# Patient Record
Sex: MALE | Race: WHITE | Employment: FULL TIME | URBAN - METROPOLITAN AREA
[De-identification: names, ages, dates, MRNs, and addresses within clinical notes are randomized per-mention and may not be internally consistent; named-entity substitution may affect disease eponyms.]

---

## 2020-10-19 NOTE — PERIOP NOTES
Spoke to Emelyn at Dr Ricardo Prieto office. Stated pt lives in [place], is having pre-assessment completed in [place], and will bring copy of COVID results with him day of surgery. Phoned pt and asked to email COVID results as soon as he could. [employer] email address given and he will send them soon.

## 2020-10-22 ENCOUNTER — ANESTHESIA EVENT (OUTPATIENT)
Dept: SURGERY | Age: 55
DRG: 327 | End: 2020-10-22
Payer: COMMERCIAL

## 2020-10-22 NOTE — ANESTHESIA PREPROCEDURE EVALUATION
Relevant Problems   No relevant active problems       Anesthetic History               Review of Systems / Medical History  Patient summary reviewed and pertinent labs reviewed    Pulmonary                   Neuro/Psych              Cardiovascular                  Exercise tolerance: >4 METS     GI/Hepatic/Renal               Comments: Achalasia Endo/Other        Cancer (Tonsil s/p Radiation Tx)     Other Findings            Physical Exam    Airway  Mallampati: II  TM Distance: > 6 cm  Neck ROM: normal range of motion   Mouth opening: Normal     Cardiovascular  Regular rate and rhythm,  S1 and S2 normal,  no murmur, click, rub, or gallop             Dental  No notable dental hx       Pulmonary  Breath sounds clear to auscultation               Abdominal         Other Findings            Anesthetic Plan    ASA: 2  Anesthesia type: general            Anesthetic plan and risks discussed with: Patient and Spouse

## 2020-10-23 ENCOUNTER — HOSPITAL ENCOUNTER (INPATIENT)
Age: 55
LOS: 3 days | Discharge: HOME | DRG: 327 | End: 2020-10-26
Attending: SURGERY | Admitting: SURGERY
Payer: COMMERCIAL

## 2020-10-23 ENCOUNTER — ANESTHESIA (OUTPATIENT)
Dept: SURGERY | Age: 55
DRG: 327 | End: 2020-10-23
Payer: COMMERCIAL

## 2020-10-23 DIAGNOSIS — K22.0 ACHALASIA OF ESOPHAGUS: ICD-10-CM

## 2020-10-23 LAB
ABO + RH BLD: NORMAL
ANION GAP SERPL CALC-SCNC: 4 MMOL/L (ref 7–16)
BASOPHILS # BLD: 0 K/UL (ref 0–0.2)
BASOPHILS NFR BLD: 1 % (ref 0–2)
BLOOD GROUP ANTIBODIES SERPL: NORMAL
BUN SERPL-MCNC: 21 MG/DL (ref 6–23)
CALCIUM SERPL-MCNC: 9.1 MG/DL (ref 8.3–10.4)
CHLORIDE SERPL-SCNC: 109 MMOL/L (ref 98–107)
CO2 SERPL-SCNC: 30 MMOL/L (ref 21–32)
CREAT SERPL-MCNC: 1.08 MG/DL (ref 0.8–1.5)
DIFFERENTIAL METHOD BLD: ABNORMAL
EOSINOPHIL # BLD: 0.1 K/UL (ref 0–0.8)
EOSINOPHIL NFR BLD: 2 % (ref 0.5–7.8)
ERYTHROCYTE [DISTWIDTH] IN BLOOD BY AUTOMATED COUNT: 13.9 % (ref 11.9–14.6)
GLUCOSE SERPL-MCNC: 101 MG/DL (ref 65–100)
HCT VFR BLD AUTO: 44.5 % (ref 41.1–50.3)
HGB BLD-MCNC: 15 G/DL (ref 13.6–17.2)
IMM GRANULOCYTES # BLD AUTO: 0 K/UL (ref 0–0.5)
IMM GRANULOCYTES NFR BLD AUTO: 0 % (ref 0–5)
LYMPHOCYTES # BLD: 1.4 K/UL (ref 0.5–4.6)
LYMPHOCYTES NFR BLD: 30 % (ref 13–44)
MCH RBC QN AUTO: 29.4 PG (ref 26.1–32.9)
MCHC RBC AUTO-ENTMCNC: 33.7 G/DL (ref 31.4–35)
MCV RBC AUTO: 87.3 FL (ref 79.6–97.8)
MONOCYTES # BLD: 0.6 K/UL (ref 0.1–1.3)
MONOCYTES NFR BLD: 12 % (ref 4–12)
NEUTS SEG # BLD: 2.7 K/UL (ref 1.7–8.2)
NEUTS SEG NFR BLD: 56 % (ref 43–78)
NRBC # BLD: 0 K/UL (ref 0–0.2)
PLATELET # BLD AUTO: 146 K/UL (ref 150–450)
PMV BLD AUTO: 9.8 FL (ref 9.4–12.3)
POTASSIUM SERPL-SCNC: 3.9 MMOL/L (ref 3.5–5.1)
RBC # BLD AUTO: 5.1 M/UL (ref 4.23–5.6)
SODIUM SERPL-SCNC: 143 MMOL/L (ref 136–145)
SPECIMEN EXP DATE BLD: NORMAL
WBC # BLD AUTO: 4.8 K/UL (ref 4.3–11.1)

## 2020-10-23 PROCEDURE — 74011000250 HC RX REV CODE- 250: Performed by: NURSE ANESTHETIST, CERTIFIED REGISTERED

## 2020-10-23 PROCEDURE — 77030002933 HC SUT MCRYL J&J -A: Performed by: SURGERY

## 2020-10-23 PROCEDURE — 77030031641: Performed by: SURGERY

## 2020-10-23 PROCEDURE — 76010000877 HC OR TIME 2.5 TO 3HR INTENSV - TIER 2: Performed by: SURGERY

## 2020-10-23 PROCEDURE — 0D844ZZ DIVISION OF ESOPHAGOGASTRIC JUNCTION, PERCUTANEOUS ENDOSCOPIC APPROACH: ICD-10-PCS | Performed by: SURGERY

## 2020-10-23 PROCEDURE — 74011250636 HC RX REV CODE- 250/636: Performed by: ANESTHESIOLOGY

## 2020-10-23 PROCEDURE — 77030040503 HC DRN WND PENRS MDII -A: Performed by: SURGERY

## 2020-10-23 PROCEDURE — 77030008462 HC STPLR SKN PROX J&J -A: Performed by: SURGERY

## 2020-10-23 PROCEDURE — 76060000036 HC ANESTHESIA 2.5 TO 3 HR: Performed by: SURGERY

## 2020-10-23 PROCEDURE — 43279 LAP MYOTOMY HELLER: CPT | Performed by: PHYSICIAN ASSISTANT

## 2020-10-23 PROCEDURE — 77030031139 HC SUT VCRL2 J&J -A: Performed by: SURGERY

## 2020-10-23 PROCEDURE — 43279 LAP MYOTOMY HELLER: CPT | Performed by: SURGERY

## 2020-10-23 PROCEDURE — 77030035048 HC TRCR ENDOSC OPTCL COVD -B: Performed by: SURGERY

## 2020-10-23 PROCEDURE — 77030002996 HC SUT SLK J&J -A: Performed by: SURGERY

## 2020-10-23 PROCEDURE — 74011250637 HC RX REV CODE- 250/637: Performed by: ANESTHESIOLOGY

## 2020-10-23 PROCEDURE — 77030018014 HC RETRCTR ENDOSC1 COVD -C: Performed by: SURGERY

## 2020-10-23 PROCEDURE — 77030034849: Performed by: SURGERY

## 2020-10-23 PROCEDURE — 74011000250 HC RX REV CODE- 250: Performed by: PHYSICIAN ASSISTANT

## 2020-10-23 PROCEDURE — 77030035277 HC OBTRTR BLDELSS DISP INTU -B: Performed by: SURGERY

## 2020-10-23 PROCEDURE — 77030008756 HC TU IRR SUC STRY -B: Performed by: SURGERY

## 2020-10-23 PROCEDURE — 86900 BLOOD TYPING SEROLOGIC ABO: CPT

## 2020-10-23 PROCEDURE — 77030002912 HC SUT ETHBND J&J -A: Performed by: SURGERY

## 2020-10-23 PROCEDURE — 74011250636 HC RX REV CODE- 250/636

## 2020-10-23 PROCEDURE — 77030037088 HC TUBE ENDOTRACH ORAL NSL COVD-A: Performed by: NURSE ANESTHETIST, CERTIFIED REGISTERED

## 2020-10-23 PROCEDURE — 77030039283 HC SHR HARM INSRT DISP DAVNC INTU -F: Performed by: SURGERY

## 2020-10-23 PROCEDURE — 77030016151 HC PROTCTR LNS DFOG COVD -B: Performed by: SURGERY

## 2020-10-23 PROCEDURE — 77030021689 HC INSRT SHR HARM INTU -E: Performed by: SURGERY

## 2020-10-23 PROCEDURE — 65270000029 HC RM PRIVATE

## 2020-10-23 PROCEDURE — 77030012406 HC DRN WND PENRS BARD -A: Performed by: SURGERY

## 2020-10-23 PROCEDURE — 2709999900 HC NON-CHARGEABLE SUPPLY

## 2020-10-23 PROCEDURE — 2709999900 HC NON-CHARGEABLE SUPPLY: Performed by: SURGERY

## 2020-10-23 PROCEDURE — 77030008522 HC TBNG INSUF LAPRO STRY -B: Performed by: SURGERY

## 2020-10-23 PROCEDURE — 74011250637 HC RX REV CODE- 250/637: Performed by: SURGERY

## 2020-10-23 PROCEDURE — 77030019908 HC STETH ESOPH SIMS -A: Performed by: NURSE ANESTHETIST, CERTIFIED REGISTERED

## 2020-10-23 PROCEDURE — 0DQ64ZZ REPAIR STOMACH, PERCUTANEOUS ENDOSCOPIC APPROACH: ICD-10-PCS | Performed by: SURGERY

## 2020-10-23 PROCEDURE — 74011250636 HC RX REV CODE- 250/636: Performed by: PHYSICIAN ASSISTANT

## 2020-10-23 PROCEDURE — 77030020703 HC SEAL CANN DISP INTU -B: Performed by: SURGERY

## 2020-10-23 PROCEDURE — 74011250636 HC RX REV CODE- 250/636: Performed by: NURSE PRACTITIONER

## 2020-10-23 PROCEDURE — 80048 BASIC METABOLIC PNL TOTAL CA: CPT

## 2020-10-23 PROCEDURE — C9113 INJ PANTOPRAZOLE SODIUM, VIA: HCPCS | Performed by: PHYSICIAN ASSISTANT

## 2020-10-23 PROCEDURE — 76210000006 HC OR PH I REC 0.5 TO 1 HR: Performed by: SURGERY

## 2020-10-23 PROCEDURE — 77030039425 HC BLD LARYNG TRULITE DISP TELE -A: Performed by: NURSE ANESTHETIST, CERTIFIED REGISTERED

## 2020-10-23 PROCEDURE — 74011250636 HC RX REV CODE- 250/636: Performed by: NURSE ANESTHETIST, CERTIFIED REGISTERED

## 2020-10-23 PROCEDURE — 77030010507 HC ADH SKN DERMBND J&J -B: Performed by: SURGERY

## 2020-10-23 PROCEDURE — 74011000250 HC RX REV CODE- 250: Performed by: SURGERY

## 2020-10-23 PROCEDURE — 74011000258 HC RX REV CODE- 258: Performed by: PHYSICIAN ASSISTANT

## 2020-10-23 PROCEDURE — 8E0W4CZ ROBOTIC ASSISTED PROCEDURE OF TRUNK REGION, PERCUTANEOUS ENDOSCOPIC APPROACH: ICD-10-PCS | Performed by: SURGERY

## 2020-10-23 PROCEDURE — 77030040922 HC BLNKT HYPOTHRM STRY -A: Performed by: NURSE ANESTHETIST, CERTIFIED REGISTERED

## 2020-10-23 PROCEDURE — 85025 COMPLETE CBC W/AUTO DIFF WBC: CPT

## 2020-10-23 RX ORDER — DEXTROSE MONOHYDRATE AND SODIUM CHLORIDE 5; .45 G/100ML; G/100ML
75 INJECTION, SOLUTION INTRAVENOUS CONTINUOUS
Status: DISCONTINUED | OUTPATIENT
Start: 2020-10-23 | End: 2020-10-26 | Stop reason: HOSPADM

## 2020-10-23 RX ORDER — ONDANSETRON 2 MG/ML
INJECTION INTRAMUSCULAR; INTRAVENOUS AS NEEDED
Status: DISCONTINUED | OUTPATIENT
Start: 2020-10-23 | End: 2020-10-23 | Stop reason: HOSPADM

## 2020-10-23 RX ORDER — ONDANSETRON 2 MG/ML
4 INJECTION INTRAMUSCULAR; INTRAVENOUS
Status: DISCONTINUED | OUTPATIENT
Start: 2020-10-23 | End: 2020-10-26 | Stop reason: HOSPADM

## 2020-10-23 RX ORDER — KETOROLAC TROMETHAMINE 30 MG/ML
15 INJECTION, SOLUTION INTRAMUSCULAR; INTRAVENOUS
Status: DISPENSED | OUTPATIENT
Start: 2020-10-23 | End: 2020-10-24

## 2020-10-23 RX ORDER — EPHEDRINE SULFATE/0.9% NACL/PF 50 MG/5 ML
SYRINGE (ML) INTRAVENOUS AS NEEDED
Status: DISCONTINUED | OUTPATIENT
Start: 2020-10-23 | End: 2020-10-23 | Stop reason: HOSPADM

## 2020-10-23 RX ORDER — HYDROMORPHONE HYDROCHLORIDE 2 MG/ML
0.5 INJECTION, SOLUTION INTRAMUSCULAR; INTRAVENOUS; SUBCUTANEOUS
Status: DISCONTINUED | OUTPATIENT
Start: 2020-10-23 | End: 2020-10-23 | Stop reason: HOSPADM

## 2020-10-23 RX ORDER — LIDOCAINE HYDROCHLORIDE 20 MG/ML
INJECTION, SOLUTION EPIDURAL; INFILTRATION; INTRACAUDAL; PERINEURAL AS NEEDED
Status: DISCONTINUED | OUTPATIENT
Start: 2020-10-23 | End: 2020-10-23 | Stop reason: HOSPADM

## 2020-10-23 RX ORDER — BUPIVACAINE HYDROCHLORIDE AND EPINEPHRINE 2.5; 5 MG/ML; UG/ML
INJECTION, SOLUTION EPIDURAL; INFILTRATION; INTRACAUDAL; PERINEURAL AS NEEDED
Status: DISCONTINUED | OUTPATIENT
Start: 2020-10-23 | End: 2020-10-23 | Stop reason: HOSPADM

## 2020-10-23 RX ORDER — CEFAZOLIN SODIUM/WATER 2 G/20 ML
2 SYRINGE (ML) INTRAVENOUS
Status: COMPLETED | OUTPATIENT
Start: 2020-10-23 | End: 2020-10-23

## 2020-10-23 RX ORDER — HYDROCODONE BITARTRATE AND ACETAMINOPHEN 7.5; 325 MG/1; MG/1
1 TABLET ORAL AS NEEDED
Status: DISCONTINUED | OUTPATIENT
Start: 2020-10-23 | End: 2020-10-23

## 2020-10-23 RX ORDER — GUAIFENESIN 100 MG/5ML
81 LIQUID (ML) ORAL DAILY
COMMUNITY

## 2020-10-23 RX ORDER — FENTANYL CITRATE 50 UG/ML
100 INJECTION, SOLUTION INTRAMUSCULAR; INTRAVENOUS ONCE
Status: DISCONTINUED | OUTPATIENT
Start: 2020-10-23 | End: 2020-10-23 | Stop reason: HOSPADM

## 2020-10-23 RX ORDER — NALOXONE HYDROCHLORIDE 0.4 MG/ML
0.4 INJECTION, SOLUTION INTRAMUSCULAR; INTRAVENOUS; SUBCUTANEOUS AS NEEDED
Status: DISCONTINUED | OUTPATIENT
Start: 2020-10-23 | End: 2020-10-26 | Stop reason: HOSPADM

## 2020-10-23 RX ORDER — ROCURONIUM BROMIDE 10 MG/ML
INJECTION, SOLUTION INTRAVENOUS AS NEEDED
Status: DISCONTINUED | OUTPATIENT
Start: 2020-10-23 | End: 2020-10-23 | Stop reason: HOSPADM

## 2020-10-23 RX ORDER — SODIUM CHLORIDE 0.9 % (FLUSH) 0.9 %
5-40 SYRINGE (ML) INJECTION AS NEEDED
Status: DISCONTINUED | OUTPATIENT
Start: 2020-10-23 | End: 2020-10-23 | Stop reason: HOSPADM

## 2020-10-23 RX ORDER — DEXAMETHASONE SODIUM PHOSPHATE 4 MG/ML
INJECTION, SOLUTION INTRA-ARTICULAR; INTRALESIONAL; INTRAMUSCULAR; INTRAVENOUS; SOFT TISSUE AS NEEDED
Status: DISCONTINUED | OUTPATIENT
Start: 2020-10-23 | End: 2020-10-23 | Stop reason: HOSPADM

## 2020-10-23 RX ORDER — ONDANSETRON 2 MG/ML
INJECTION INTRAMUSCULAR; INTRAVENOUS
Status: COMPLETED
Start: 2020-10-23 | End: 2020-10-23

## 2020-10-23 RX ORDER — HYDROMORPHONE HYDROCHLORIDE 1 MG/ML
1 INJECTION, SOLUTION INTRAMUSCULAR; INTRAVENOUS; SUBCUTANEOUS
Status: DISCONTINUED | OUTPATIENT
Start: 2020-10-23 | End: 2020-10-26 | Stop reason: HOSPADM

## 2020-10-23 RX ORDER — OXYCODONE HYDROCHLORIDE 5 MG/1
5 TABLET ORAL
Status: DISCONTINUED | OUTPATIENT
Start: 2020-10-23 | End: 2020-10-23

## 2020-10-23 RX ORDER — MIDAZOLAM HYDROCHLORIDE 1 MG/ML
INJECTION, SOLUTION INTRAMUSCULAR; INTRAVENOUS AS NEEDED
Status: DISCONTINUED | OUTPATIENT
Start: 2020-10-23 | End: 2020-10-23 | Stop reason: HOSPADM

## 2020-10-23 RX ORDER — SODIUM CHLORIDE, SODIUM LACTATE, POTASSIUM CHLORIDE, CALCIUM CHLORIDE 600; 310; 30; 20 MG/100ML; MG/100ML; MG/100ML; MG/100ML
100 INJECTION, SOLUTION INTRAVENOUS CONTINUOUS
Status: DISCONTINUED | OUTPATIENT
Start: 2020-10-23 | End: 2020-10-23 | Stop reason: HOSPADM

## 2020-10-23 RX ORDER — SODIUM CHLORIDE 9 MG/ML
25 INJECTION, SOLUTION INTRAVENOUS CONTINUOUS
Status: DISCONTINUED | OUTPATIENT
Start: 2020-10-23 | End: 2020-10-23 | Stop reason: HOSPADM

## 2020-10-23 RX ORDER — LIDOCAINE HYDROCHLORIDE 10 MG/ML
0.1 INJECTION INFILTRATION; PERINEURAL AS NEEDED
Status: DISCONTINUED | OUTPATIENT
Start: 2020-10-23 | End: 2020-10-23 | Stop reason: HOSPADM

## 2020-10-23 RX ORDER — SODIUM CHLORIDE 0.9 % (FLUSH) 0.9 %
5-40 SYRINGE (ML) INJECTION EVERY 8 HOURS
Status: DISCONTINUED | OUTPATIENT
Start: 2020-10-23 | End: 2020-10-23 | Stop reason: HOSPADM

## 2020-10-23 RX ORDER — FENTANYL CITRATE 50 UG/ML
INJECTION, SOLUTION INTRAMUSCULAR; INTRAVENOUS AS NEEDED
Status: DISCONTINUED | OUTPATIENT
Start: 2020-10-23 | End: 2020-10-23 | Stop reason: HOSPADM

## 2020-10-23 RX ORDER — PROPOFOL 10 MG/ML
INJECTION, EMULSION INTRAVENOUS AS NEEDED
Status: DISCONTINUED | OUTPATIENT
Start: 2020-10-23 | End: 2020-10-23 | Stop reason: HOSPADM

## 2020-10-23 RX ORDER — FAMOTIDINE 20 MG/1
20 TABLET, FILM COATED ORAL ONCE
Status: COMPLETED | OUTPATIENT
Start: 2020-10-23 | End: 2020-10-23

## 2020-10-23 RX ORDER — MIDAZOLAM HYDROCHLORIDE 1 MG/ML
2 INJECTION, SOLUTION INTRAMUSCULAR; INTRAVENOUS
Status: COMPLETED | OUTPATIENT
Start: 2020-10-23 | End: 2020-10-23

## 2020-10-23 RX ORDER — SODIUM CHLORIDE 0.9 % (FLUSH) 0.9 %
5-40 SYRINGE (ML) INJECTION EVERY 8 HOURS
Status: DISCONTINUED | OUTPATIENT
Start: 2020-10-23 | End: 2020-10-26 | Stop reason: HOSPADM

## 2020-10-23 RX ORDER — SODIUM CHLORIDE 0.9 % (FLUSH) 0.9 %
5-40 SYRINGE (ML) INJECTION AS NEEDED
Status: DISCONTINUED | OUTPATIENT
Start: 2020-10-23 | End: 2020-10-26 | Stop reason: HOSPADM

## 2020-10-23 RX ORDER — NALOXONE HYDROCHLORIDE 0.4 MG/ML
0.1 INJECTION, SOLUTION INTRAMUSCULAR; INTRAVENOUS; SUBCUTANEOUS AS NEEDED
Status: DISCONTINUED | OUTPATIENT
Start: 2020-10-23 | End: 2020-10-23 | Stop reason: HOSPADM

## 2020-10-23 RX ORDER — SODIUM CHLORIDE, SODIUM LACTATE, POTASSIUM CHLORIDE, CALCIUM CHLORIDE 600; 310; 30; 20 MG/100ML; MG/100ML; MG/100ML; MG/100ML
INJECTION, SOLUTION INTRAVENOUS
Status: DISCONTINUED | OUTPATIENT
Start: 2020-10-23 | End: 2020-10-23 | Stop reason: HOSPADM

## 2020-10-23 RX ORDER — HYDROMORPHONE HYDROCHLORIDE 2 MG/ML
INJECTION, SOLUTION INTRAMUSCULAR; INTRAVENOUS; SUBCUTANEOUS AS NEEDED
Status: DISCONTINUED | OUTPATIENT
Start: 2020-10-23 | End: 2020-10-23 | Stop reason: HOSPADM

## 2020-10-23 RX ORDER — LIDOCAINE HYDROCHLORIDE ANHYDROUS AND DEXTROSE MONOHYDRATE .4; 5 G/100ML; G/100ML
INJECTION, SOLUTION INTRAVENOUS
Status: DISCONTINUED | OUTPATIENT
Start: 2020-10-23 | End: 2020-10-23 | Stop reason: HOSPADM

## 2020-10-23 RX ADMIN — SODIUM CHLORIDE, SODIUM LACTATE, POTASSIUM CHLORIDE, AND CALCIUM CHLORIDE: 600; 310; 30; 20 INJECTION, SOLUTION INTRAVENOUS at 08:42

## 2020-10-23 RX ADMIN — PHENYLEPHRINE HYDROCHLORIDE 60 MCG: 10 INJECTION INTRAVENOUS at 08:09

## 2020-10-23 RX ADMIN — LIDOCAINE HYDROCHLORIDE 1 MG/KG/HR: 4 INJECTION, SOLUTION INTRAVENOUS at 08:30

## 2020-10-23 RX ADMIN — HYDROMORPHONE HYDROCHLORIDE 1 MG: 1 INJECTION, SOLUTION INTRAMUSCULAR; INTRAVENOUS; SUBCUTANEOUS at 19:15

## 2020-10-23 RX ADMIN — ROCURONIUM BROMIDE 10 MG: 10 INJECTION, SOLUTION INTRAVENOUS at 08:15

## 2020-10-23 RX ADMIN — SUGAMMADEX 200 MG: 100 INJECTION, SOLUTION INTRAVENOUS at 10:00

## 2020-10-23 RX ADMIN — SODIUM CHLORIDE 40 MG: 9 INJECTION INTRAMUSCULAR; INTRAVENOUS; SUBCUTANEOUS at 15:10

## 2020-10-23 RX ADMIN — LIDOCAINE HYDROCHLORIDE 80 MG: 20 INJECTION, SOLUTION EPIDURAL; INFILTRATION; INTRACAUDAL; PERINEURAL at 07:54

## 2020-10-23 RX ADMIN — ONDANSETRON 4 MG: 2 INJECTION INTRAMUSCULAR; INTRAVENOUS at 19:15

## 2020-10-23 RX ADMIN — HYDROMORPHONE HYDROCHLORIDE 0.5 MG: 2 INJECTION INTRAMUSCULAR; INTRAVENOUS; SUBCUTANEOUS at 12:23

## 2020-10-23 RX ADMIN — Medication 10 MG: at 08:05

## 2020-10-23 RX ADMIN — HYDROMORPHONE HYDROCHLORIDE 0.5 MG: 2 INJECTION INTRAMUSCULAR; INTRAVENOUS; SUBCUTANEOUS at 10:58

## 2020-10-23 RX ADMIN — SODIUM CHLORIDE, SODIUM LACTATE, POTASSIUM CHLORIDE, AND CALCIUM CHLORIDE: 600; 310; 30; 20 INJECTION, SOLUTION INTRAVENOUS at 08:30

## 2020-10-23 RX ADMIN — DEXTROSE MONOHYDRATE AND SODIUM CHLORIDE 75 ML/HR: 5; .45 INJECTION, SOLUTION INTRAVENOUS at 15:11

## 2020-10-23 RX ADMIN — ROCURONIUM BROMIDE 20 MG: 10 INJECTION, SOLUTION INTRAVENOUS at 08:52

## 2020-10-23 RX ADMIN — Medication 5 MG: at 09:30

## 2020-10-23 RX ADMIN — ONDANSETRON 4 MG: 2 INJECTION INTRAMUSCULAR; INTRAVENOUS at 13:08

## 2020-10-23 RX ADMIN — ROCURONIUM BROMIDE 10 MG: 10 INJECTION, SOLUTION INTRAVENOUS at 09:30

## 2020-10-23 RX ADMIN — PROPOFOL 200 MG: 10 INJECTION, EMULSION INTRAVENOUS at 07:54

## 2020-10-23 RX ADMIN — FAMOTIDINE 20 MG: 20 TABLET, FILM COATED ORAL at 06:17

## 2020-10-23 RX ADMIN — SODIUM CHLORIDE, SODIUM LACTATE, POTASSIUM CHLORIDE, AND CALCIUM CHLORIDE 100 ML/HR: 600; 310; 30; 20 INJECTION, SOLUTION INTRAVENOUS at 06:29

## 2020-10-23 RX ADMIN — Medication 5 MG: at 08:03

## 2020-10-23 RX ADMIN — Medication 10 ML: at 15:24

## 2020-10-23 RX ADMIN — Medication 10 ML: at 21:00

## 2020-10-23 RX ADMIN — PHENYLEPHRINE HYDROCHLORIDE 20 MCG/MIN: 10 INJECTION INTRAVENOUS at 08:30

## 2020-10-23 RX ADMIN — SODIUM CHLORIDE 40 MG: 9 INJECTION INTRAMUSCULAR; INTRAVENOUS; SUBCUTANEOUS at 20:58

## 2020-10-23 RX ADMIN — ONDANSETRON 4 MG: 2 INJECTION INTRAMUSCULAR; INTRAVENOUS at 10:00

## 2020-10-23 RX ADMIN — FENTANYL CITRATE 100 MCG: 50 INJECTION INTRAMUSCULAR; INTRAVENOUS at 07:54

## 2020-10-23 RX ADMIN — PHENYLEPHRINE HYDROCHLORIDE 120 MCG: 10 INJECTION INTRAVENOUS at 08:34

## 2020-10-23 RX ADMIN — Medication 5 MG: at 08:34

## 2020-10-23 RX ADMIN — ROCURONIUM BROMIDE 40 MG: 10 INJECTION, SOLUTION INTRAVENOUS at 07:55

## 2020-10-23 RX ADMIN — PHENYLEPHRINE HYDROCHLORIDE 120 MCG: 10 INJECTION INTRAVENOUS at 08:05

## 2020-10-23 RX ADMIN — PHENYLEPHRINE HYDROCHLORIDE 120 MCG: 10 INJECTION INTRAVENOUS at 08:16

## 2020-10-23 RX ADMIN — Medication 2 G: at 08:11

## 2020-10-23 RX ADMIN — MIDAZOLAM 2 MG: 1 INJECTION INTRAMUSCULAR; INTRAVENOUS at 07:51

## 2020-10-23 RX ADMIN — MIDAZOLAM 2 MG: 1 INJECTION INTRAMUSCULAR; INTRAVENOUS at 06:55

## 2020-10-23 RX ADMIN — Medication 5 MG: at 08:16

## 2020-10-23 RX ADMIN — Medication 1 EACH: at 15:10

## 2020-10-23 RX ADMIN — DEXAMETHASONE SODIUM PHOSPHATE 10 MG: 4 INJECTION, SOLUTION INTRAMUSCULAR; INTRAVENOUS at 08:15

## 2020-10-23 RX ADMIN — HYDROMORPHONE HYDROCHLORIDE 0.2 MG: 2 INJECTION INTRAMUSCULAR; INTRAVENOUS; SUBCUTANEOUS at 10:14

## 2020-10-23 RX ADMIN — Medication 5 MG: at 08:09

## 2020-10-23 RX ADMIN — HYDROMORPHONE HYDROCHLORIDE 1 MG: 1 INJECTION, SOLUTION INTRAMUSCULAR; INTRAVENOUS; SUBCUTANEOUS at 15:09

## 2020-10-23 RX ADMIN — ROCURONIUM BROMIDE 10 MG: 10 INJECTION, SOLUTION INTRAVENOUS at 09:45

## 2020-10-23 NOTE — ANESTHESIA POSTPROCEDURE EVALUATION
Procedure(s):  REDO HELLER MYOTOMY ROBOTIC ASSISTED. general    Anesthesia Post Evaluation      Multimodal analgesia: multimodal analgesia used between 6 hours prior to anesthesia start to PACU discharge  Patient location during evaluation: PACU  Patient participation: complete - patient participated  Level of consciousness: awake and alert  Pain management: adequate  Airway patency: patent  Anesthetic complications: no  Cardiovascular status: acceptable  Respiratory status: acceptable  Hydration status: acceptable  Post anesthesia nausea and vomiting:  none  Final Post Anesthesia Temperature Assessment:  Normothermia (36.0-37.5 degrees C)      INITIAL Post-op Vital signs:   Vitals Value Taken Time   /60 10/23/2020 11:06 AM   Temp 36.8 °C (98.3 °F) 10/23/2020 10:24 AM   Pulse 65 10/23/2020 11:06 AM   Resp 16 10/23/2020 10:56 AM   SpO2 100 % 10/23/2020 11:06 AM   Vitals shown include unvalidated device data.

## 2020-10-23 NOTE — PERIOP NOTES
TRANSFER - OUT REPORT:    Verbal report given to Kate Ibanez on Karlene Cardozo  being transferred to [facility] for routine post - op       Report consisted of patients Situation, Background, Assessment and   Recommendations(SBAR). Information from the following report(s) OR Summary, Procedure Summary, Intake/Output and MAR was reviewed with the receiving nurse. Lines:   Peripheral IV 10/23/20 Right Hand (Active)   Site Assessment Clean, dry, & intact 10/23/20 1116   Phlebitis Assessment 0 10/23/20 1116   Infiltration Assessment 0 10/23/20 1116   Dressing Status Clean, dry, & intact 10/23/20 1116   Dressing Type Tape;Transparent 10/23/20 1116   Hub Color/Line Status Patent 10/23/20 1116   Alcohol Cap Used No 10/23/20 0629        Opportunity for questions and clarification was provided. Patient transported with:   O2 @ 2 liters  Tech    VTE prophylaxis orders have been written for Karlene Cardozo. Patient and family given floor number and nurses name. Family updated re: pt status after security code verified.

## 2020-10-23 NOTE — BRIEF OP NOTE
Brief Postoperative Note    Patient: Jese Henry  YOB: 1965  MRN: [id number]    Date of Procedure: 10/23/2020     Pre-Op Diagnosis: Achalasia [K22.0]    Post-Op Diagnosis: Same as preoperative diagnosis.       Procedure(s):  REDO HELLER MYOTOMY ROBOTIC ASSISTED    Surgeon(s):  Cash Elizabeth MD    Surgical Assistant: Physician Assistant: JOSUE Mejia    Anesthesia: General     Estimated Blood Loss (mL): Minimal    Complications: Unplanned perforation of proximal stomach    Specimens: * No specimens in log *     Implants: * No implants in log *    Drains: * No LDAs found *    Findings: see operative report    Electronically Signed by JOSUE Purdy on 10/23/2020 at 10:18 AM

## 2020-10-24 LAB
ANION GAP SERPL CALC-SCNC: 2 MMOL/L (ref 7–16)
BUN SERPL-MCNC: 14 MG/DL (ref 6–23)
CALCIUM SERPL-MCNC: 8.6 MG/DL (ref 8.3–10.4)
CHLORIDE SERPL-SCNC: 108 MMOL/L (ref 98–107)
CO2 SERPL-SCNC: 31 MMOL/L (ref 21–32)
CREAT SERPL-MCNC: 1.08 MG/DL (ref 0.8–1.5)
ERYTHROCYTE [DISTWIDTH] IN BLOOD BY AUTOMATED COUNT: 13.8 % (ref 11.9–14.6)
GLUCOSE SERPL-MCNC: 128 MG/DL (ref 65–100)
HCT VFR BLD AUTO: 40.9 % (ref 41.1–50.3)
HGB BLD-MCNC: 13.5 G/DL (ref 13.6–17.2)
MCH RBC QN AUTO: 28.6 PG (ref 26.1–32.9)
MCHC RBC AUTO-ENTMCNC: 33 G/DL (ref 31.4–35)
MCV RBC AUTO: 86.7 FL (ref 79.6–97.8)
NRBC # BLD: 0 K/UL (ref 0–0.2)
PLATELET # BLD AUTO: 133 K/UL (ref 150–450)
PMV BLD AUTO: 10.4 FL (ref 9.4–12.3)
POTASSIUM SERPL-SCNC: 4.4 MMOL/L (ref 3.5–5.1)
RBC # BLD AUTO: 4.72 M/UL (ref 4.23–5.6)
SODIUM SERPL-SCNC: 141 MMOL/L (ref 136–145)
WBC # BLD AUTO: 8.8 K/UL (ref 4.3–11.1)

## 2020-10-24 PROCEDURE — 85027 COMPLETE CBC AUTOMATED: CPT

## 2020-10-24 PROCEDURE — 74011250636 HC RX REV CODE- 250/636: Performed by: PHYSICIAN ASSISTANT

## 2020-10-24 PROCEDURE — 80048 BASIC METABOLIC PNL TOTAL CA: CPT

## 2020-10-24 PROCEDURE — 74011000250 HC RX REV CODE- 250: Performed by: PHYSICIAN ASSISTANT

## 2020-10-24 PROCEDURE — C9113 INJ PANTOPRAZOLE SODIUM, VIA: HCPCS | Performed by: PHYSICIAN ASSISTANT

## 2020-10-24 PROCEDURE — 36415 COLL VENOUS BLD VENIPUNCTURE: CPT

## 2020-10-24 PROCEDURE — 65270000029 HC RM PRIVATE

## 2020-10-24 PROCEDURE — 74011000258 HC RX REV CODE- 258: Performed by: PHYSICIAN ASSISTANT

## 2020-10-24 PROCEDURE — 2709999900 HC NON-CHARGEABLE SUPPLY

## 2020-10-24 RX ORDER — CYCLOBENZAPRINE HCL 10 MG
5 TABLET ORAL
Status: DISCONTINUED | OUTPATIENT
Start: 2020-10-24 | End: 2020-10-26 | Stop reason: HOSPADM

## 2020-10-24 RX ADMIN — DEXTROSE MONOHYDRATE AND SODIUM CHLORIDE 75 ML/HR: 5; .45 INJECTION, SOLUTION INTRAVENOUS at 17:17

## 2020-10-24 RX ADMIN — KETOROLAC TROMETHAMINE 15 MG: 30 INJECTION, SOLUTION INTRAMUSCULAR at 03:19

## 2020-10-24 RX ADMIN — SODIUM CHLORIDE 40 MG: 9 INJECTION INTRAMUSCULAR; INTRAVENOUS; SUBCUTANEOUS at 08:41

## 2020-10-24 RX ADMIN — DEXTROSE MONOHYDRATE AND SODIUM CHLORIDE 75 ML/HR: 5; .45 INJECTION, SOLUTION INTRAVENOUS at 03:11

## 2020-10-24 RX ADMIN — SODIUM CHLORIDE 40 MG: 9 INJECTION INTRAMUSCULAR; INTRAVENOUS; SUBCUTANEOUS at 20:25

## 2020-10-24 RX ADMIN — KETOROLAC TROMETHAMINE 15 MG: 30 INJECTION, SOLUTION INTRAMUSCULAR at 13:35

## 2020-10-24 NOTE — PROGRESS NOTES
END OF SHIFT NOTE:    INTAKE/OUTPUT  10/23 0701 - 10/24 0700  In: 2367 [I.V.:2367]  Out: 2055 [Urine:2050]  Voiding: YES  Catheter: NO  Drain:              Flatus: Patient does not have flatus present. Stool:  0 occurrences. Characteristics:    Emesis: 0 occurrences. Characteristics:        VITAL SIGNS  Patient Vitals for the past 12 hrs:   Temp Pulse Resp BP SpO2   10/24/20 1531 98.2 °F (36.8 °C) (!) 55 16 (!) 105/55 98 %   10/24/20 1146 97.9 °F (36.6 °C) (!) 58 16 112/63 96 %       Pain Assessment  Pain Intensity 1: 0 (10/24/20 0730)  Pain Location 1: Head  Pain Intervention(s) 1: Medication (see MAR)  Patient Stated Pain Goal: 0    Ambulating  Yes    Shift report given to oncoming nurse at the bedside.     Carmel Ramirez RN

## 2020-10-24 NOTE — PROGRESS NOTES
END OF SHIFT NOTE:    INTAKE/OUTPUT  No intake/output data recorded. Voiding: YES  Catheter: NO  Drain:      Flatus: Patient does not have flatus present. Stool:  0 occurrences. Characteristics:  Stool Assessment  Stool Appearance: Other (comment)(before surgery; normal per pt)    Emesis: 0 occurrences. Characteristics:        VITAL SIGNS  Patient Vitals for the past 12 hrs:   Temp Pulse Resp BP SpO2   10/23/20 1955 97.5 °F (36.4 °C) 64 16 (!) 106/52 95 %   10/23/20 1607 97.9 °F (36.6 °C) 65 16 (!) 98/52 98 %   10/23/20 1356 -- (!) 57 16 (!) 101/56 99 %   10/23/20 1256 -- (!) 51 16 106/60 99 %   10/23/20 1211 -- (!) 58 16 (!) 106/58 98 %   10/23/20 1156 -- 69 16 124/60 98 %   10/23/20 1141 -- 64 16 130/63 98 %   10/23/20 1126 -- 64 16 118/60 100 %   10/23/20 1121 -- 61 16 (!) 113/59 100 %   10/23/20 1116 98.5 °F (36.9 °C) 63 16 114/60 100 %   10/23/20 1111 -- (!) 58 16 (!) 107/59 100 %   10/23/20 1106 -- 65 16 116/60 100 %   10/23/20 1101 -- (!) 58 16 (!) 108/56 100 %   10/23/20 1056 -- 74 16 (!) 105/59 100 %   10/23/20 1051 -- 64 16 (!) 109/56 100 %   10/23/20 1046 -- 64 16 (!) 119/56 100 %   10/23/20 1041 -- 63 16 (!) 117/57 100 %   10/23/20 1036 -- 73 16 (!) 116/59 100 %   10/23/20 1031 -- 64 16 (!) 113/53 97 %   10/23/20 1026 -- 62 16 (!) 114/55 98 %   10/23/20 1024 98.3 °F (36.8 °C) 66 14 113/74 98 %       Pain Assessment  Pain Intensity 1: 5 (10/23/20 1445)  Pain Location 1: Abdomen  Pain Intervention(s) 1: Medication (see MAR)  Patient Stated Pain Goal: 0    Ambulating  No    Shift report given to oncoming nurse at the bedside.     Jenna Alcaraz RN

## 2020-10-24 NOTE — PROGRESS NOTES
END OF SHIFT NOTE:    INTAKE/OUTPUT  10/23 0701 - 10/24 0700  In: 2367 [I.V.:2367]  Out: 2055 [Urine:2050]  Voiding: YES  Catheter: NO  Drain:              Flatus: Patient does not have flatus present. Stool:  0 occurrences. Characteristics:  Stool Assessment  Stool Appearance: Other (comment)(before surgery; normal per pt)    Emesis: 0 occurrences. Characteristics:        VITAL SIGNS  Patient Vitals for the past 12 hrs:   Temp Pulse Resp BP SpO2   10/24/20 0411 98 °F (36.7 °C) 63 16 (!) 110/50 97 %   10/23/20 2344 98 °F (36.7 °C) 66 16 117/69 97 %   10/23/20 1955 97.5 °F (36.4 °C) 64 16 (!) 106/52 95 %       Pain Assessment  Pain Intensity 1: 0 (10/24/20 0409)  Pain Location 1: Head  Pain Intervention(s) 1: Medication (see MAR)  Patient Stated Pain Goal: 0    Ambulating  No    Shift report given to oncoming nurse at the bedside.     Cj Alston RN

## 2020-10-24 NOTE — OP NOTES
[address] [facility], [place]  OPERATIVE REPORT    Name:  Jeanelle Angelucci  MR#:  [id number]  :  1965  ACCOUNT #:  [de-identified]  DATE OF SERVICE:  10/23/2020    PREOPERATIVE DIAGNOSIS:  Achalasia with recurrence of symptoms status post robotic Heller myotomy in 2015. POSTOPERATIVE DIAGNOSIS:  Achalasia with recurrence of symptoms status post robotic Heller myotomy in 2015. PROCEDURES PERFORMED:  1.  Robotic redo Heller myotomy. 2.  Repair of hole in the stomach. SURGEON:  Yefri Francis MD    ASSISTANT:  JOSUE Jacobs    ANESTHESIA:  General.    COMPLICATIONS:  Injury to the stomach during redo myotomy with primary repair. SPECIMENS REMOVED:  None. IMPLANTS:  None. ESTIMATED BLOOD LOSS:  minimal.    PROCEDURE:  After the patient was asleep, the abdomen was prepped and draped in a sterile fashion. Time-out was carried out and all were in agreement. At this time, an incision made through the previous incision just above the umbilicus to the left of midline for the camera port. A cut down on the fascia was done and direct entry to abdominal cavity gained. A 0 Vicryl was used to control the fascia and an 8-mm robotic camera port placed. Insufflation was then done and scope introduced. Under direct visualization in the right lateral subcostal region, the exact same procedure was carried out after infiltration of the skin, subcutaneous tissue and peritoneum with 0.5% Marcaine with epinephrine. An incision was made directly over the old incision and a 5-mm liver retractor port placed. Equidistant between these through the same incision used last time, an 8-mm robotic port placed. In the left lateral subcostal region, a 5-mm assistance port placed and equidistant between that and the camera port through the old incision, an 8-mm robotic port was placed. At this time, the robot was brought in and docked. Liver retractor placed elevating the liver upwards and medially.   I then broke scrub and sat at the console. Dissection was carried out at the hanane. The patient had a previous wrap and this was taken down with the Harmonic scalpel. There was a fair amount of scar tissue, but this was done successfully. I then freed up the right hanane and a 40 lighted bougie was then placed by Anesthesia and actually, the myotomy superiorly up on the esophagus was very well intact. You could see the muscle on either side that not regrown. Where the scar tissue had reformed was actually at the beginning of the GE junction and going down onto the stomach. I dissected up into the chest and viewed this myotomy further and the mucosa could easily be seen through and therefore, I did no further dissection superiorly as this seemed to be quite satisfactory. I then took down some more scar tissue on the stomach and began my myotomy here approximately 5 cm below the GE junction. This went very well until I was approximately 2 cm below the GE junction and a very small hole was made in the stomach. This was approximately 5 mm. This was repaired with interrupted 3-0 GI silks and then once this was done, 0 Ethibonds were utilized to bury the silks in a two-layered closure utilizing the stomach. At this time, prior to repairing this, the myotomy had been completed superiorly and done very satisfactorily. The bougie was then removed. The procedure was then terminated, removing all needles. Trocar was removed. The pneumoperitoneum released. The camera port fascia was closed with interrupted 0 Vicryl and 4-0 subcuticular Monocryl with Dermabond utilized to close the incisions. JOSUE Gómez, was present and scrubbed during the entire procedure and assisted with retraction of the liver as well as the stomach in this difficult redo case.       Ruben Chan MD      [id number]  D:  10/23/2020 10:11  T:  10/23/2020 21:21  JOB #:  [id number]

## 2020-10-24 NOTE — PROGRESS NOTES
PLAN:  Strict NPO - May brush teeth  Planning GG swallow study Monday  Ok to shower  IVF  Pain/ nausea control  OOB/ ambulate  SCD  IS  Protonix    ASSESSMENT:  Admit Date: 10/23/2020   1 Day Post-Op  Procedure(s):  REDO HELLER MYOTOMY ROBOTIC ASSISTED    Principal Problem:    Achalasia (7/9/2015)    Active Problems:    Achalasia of esophagus (8/22/2015)       SUBJECTIVE:  Pt awake in bed. No complaints. Pain controlled. Denies nausea/ vomiting. AF, NAD. OBJECTIVE:  Constitutional: Alert oriented cooperative patient in no acute distress; appears stated age   Visit Vitals  BP (!) 126/57   Pulse 61   Temp 98 °F (36.7 °C)   Resp 16   Ht 5' 9.5\" (1.765 m)   Wt 162 lb 6.4 oz (73.7 kg)   SpO2 97%   BMI 23.64 kg/m²     Eyes: Sclera are clear. ENMT: no external lesions gross hearing normal; no obvious neck masses, no ear or lip lesions  CV: RRR. Normal perfusion  Resp: No JVD. Breathing is  non-labored; no audible wheezing. GI: soft and non-distended, appropriately tender, dressing c/d/i     Musculoskeletal: unremarkable with normal function. No embolic signs or cyanosis.    Neuro:  Oriented; moves all 4; no focal deficits  Psychiatric: normal affect and mood, no memory impairment      Patient Vitals for the past 24 hrs:   BP Temp Pulse Resp SpO2   10/24/20 0708 (!) 126/57 98 °F (36.7 °C) 61 16 97 %   10/24/20 0411 (!) 110/50 98 °F (36.7 °C) 63 16 97 %   10/23/20 2344 117/69 98 °F (36.7 °C) 66 16 97 %   10/23/20 1955 (!) 106/52 97.5 °F (36.4 °C) 64 16 95 %   10/23/20 1607 (!) 98/52 97.9 °F (36.6 °C) 65 16 98 %   10/23/20 1356 (!) 101/56 -- (!) 57 16 99 %   10/23/20 1256 106/60 -- (!) 51 16 99 %   10/23/20 1211 (!) 106/58 -- (!) 58 16 98 %   10/23/20 1156 124/60 -- 69 16 98 %   10/23/20 1141 130/63 -- 64 16 98 %   10/23/20 1126 118/60 -- 64 16 100 %   10/23/20 1121 (!) 113/59 -- 61 16 100 %   10/23/20 1116 114/60 98.5 °F (36.9 °C) 63 16 100 %   10/23/20 1111 (!) 107/59 -- (!) 58 16 100 %   10/23/20 1106 116/60 -- 65 16 100 %     Labs:    Recent Labs     10/24/20  0325   WBC 8.8   HGB 13.5*   *      K 4.4   *   CO2 31   BUN 14   CREA 1.08   *       Bela Trevin, NP

## 2020-10-24 NOTE — PROGRESS NOTES
END OF SHIFT NOTE:    INTAKE/OUTPUT  10/23 0701 - 10/24 0700  In: 2367 [I.V.:2367]  Out: 2055 [Urine:2050]  Voiding: YES  Catheter: NO  Drain:              Flatus: Patient does not have flatus present. Stool:  0 occurrences. Characteristics:  Stool Assessment  Stool Appearance: Other (comment)(before surgery; normal per pt)    Emesis: 0 occurrences. Characteristics:        VITAL SIGNS  Patient Vitals for the past 12 hrs:   Temp Pulse Resp BP SpO2   10/24/20 0411 98 °F (36.7 °C) 63 16 (!) 110/50 97 %   10/23/20 2344 98 °F (36.7 °C) 66 16 117/69 97 %   10/23/20 1955 97.5 °F (36.4 °C) 64 16 (!) 106/52 95 %       Pain Assessment  Pain Intensity 1: 0 (10/24/20 0409)  Pain Location 1: Head  Pain Intervention(s) 1: Medication (see MAR)  Patient Stated Pain Goal: 0    Ambulating  Yes    Shift report given to oncoming nurse at the bedside.     Rayshawn Lovelace RN

## 2020-10-24 NOTE — PROGRESS NOTES
Problem: Falls - Risk of  Goal: *Absence of Falls  Description: Document Mel Woodard Fall Risk and appropriate interventions in the flowsheet.   Outcome: Progressing Towards Goal  Note: Fall Risk Interventions:            Medication Interventions: Patient to call before getting OOB, Teach patient to arise slowly                   Problem: Patient Education: Go to Patient Education Activity  Goal: Patient/Family Education  Outcome: Progressing Towards Goal

## 2020-10-25 PROCEDURE — 74011000250 HC RX REV CODE- 250: Performed by: PHYSICIAN ASSISTANT

## 2020-10-25 PROCEDURE — C9113 INJ PANTOPRAZOLE SODIUM, VIA: HCPCS | Performed by: PHYSICIAN ASSISTANT

## 2020-10-25 PROCEDURE — 74011250636 HC RX REV CODE- 250/636: Performed by: PHYSICIAN ASSISTANT

## 2020-10-25 PROCEDURE — 2709999900 HC NON-CHARGEABLE SUPPLY

## 2020-10-25 PROCEDURE — 65270000029 HC RM PRIVATE

## 2020-10-25 PROCEDURE — 74011000258 HC RX REV CODE- 258: Performed by: PHYSICIAN ASSISTANT

## 2020-10-25 RX ADMIN — SODIUM CHLORIDE 40 MG: 9 INJECTION INTRAMUSCULAR; INTRAVENOUS; SUBCUTANEOUS at 08:58

## 2020-10-25 RX ADMIN — HYDROMORPHONE HYDROCHLORIDE 1 MG: 1 INJECTION, SOLUTION INTRAMUSCULAR; INTRAVENOUS; SUBCUTANEOUS at 09:17

## 2020-10-25 RX ADMIN — Medication 10 ML: at 19:08

## 2020-10-25 RX ADMIN — SODIUM CHLORIDE 40 MG: 9 INJECTION INTRAMUSCULAR; INTRAVENOUS; SUBCUTANEOUS at 20:06

## 2020-10-25 RX ADMIN — DEXTROSE MONOHYDRATE AND SODIUM CHLORIDE 75 ML/HR: 5; .45 INJECTION, SOLUTION INTRAVENOUS at 06:12

## 2020-10-25 RX ADMIN — ONDANSETRON 4 MG: 2 INJECTION INTRAMUSCULAR; INTRAVENOUS at 09:17

## 2020-10-25 RX ADMIN — DEXTROSE MONOHYDRATE AND SODIUM CHLORIDE 75 ML/HR: 5; .45 INJECTION, SOLUTION INTRAVENOUS at 20:16

## 2020-10-25 NOTE — PROGRESS NOTES
END OF SHIFT NOTE:    INTAKE/OUTPUT  10/24 0701 - 10/25 0700  In: 1923 [I.V.:1923]  Out: 1300 [Urine:1300]  Voiding: YES  Catheter: NO  Drain:      Flatus: Patient does not have flatus present. Stool:  0 occurrences. Characteristics:  Stool Assessment  Stool Appearance: Other (comment)(before surgery; normal per pt)    Emesis: 0 occurrences. Characteristics:        VITAL SIGNS  Patient Vitals for the past 12 hrs:   Temp Pulse Resp BP SpO2   10/25/20 1521 97.4 °F (36.3 °C) (!) 51 20 (!) 103/58 98 %   10/25/20 1119 98 °F (36.7 °C) (!) 101 19 107/60 100 %   10/25/20 0742 97.4 °F (36.3 °C) (!) 53 18 (!) 100/54 97 %       Pain Assessment  Pain Intensity 1: 1 (10/25/20 1350)  Pain Location 1: Abdomen  Pain Intervention(s) 1: Medication (see MAR)  Patient Stated Pain Goal: 2    Ambulating  Yes    Shift report given to oncoming nurse at the bedside.     Madelaine Sacks, RN

## 2020-10-25 NOTE — PROGRESS NOTES
PLAN:  Strict NPO - May brush teeth  Planning GG swallow study Monday  Ok to shower  IVF  Pain/ nausea control  OOB/ ambulate  SCD  IS  Protonix    ASSESSMENT:  Admit Date: 10/23/2020   2 Day Post-Op  Procedure(s):  REDO HELLER MYOTOMY ROBOTIC ASSISTED    Principal Problem:    Achalasia (7/9/2015)    Active Problems:    Achalasia of esophagus (8/22/2015)       SUBJECTIVE:  Pt awake in bed. No complaints. Pain controlled. Denies nausea/ vomiting. AF, NAD. OBJECTIVE:  Constitutional: Alert oriented cooperative patient in no acute distress; appears stated age   Visit Vitals  /60   Pulse (!) 101   Temp 98 °F (36.7 °C)   Resp 19   Ht 5' 9.5\" (1.765 m)   Wt 162 lb 6.4 oz (73.7 kg)   SpO2 100%   BMI 23.64 kg/m²     Eyes: Sclera are clear. ENMT: no external lesions gross hearing normal; no obvious neck masses, no ear or lip lesions  CV: RRR. Normal perfusion  Resp: No JVD. Breathing is  non-labored; no audible wheezing. GI: soft and non-distended, appropriately tender, dressing c/d/i     Musculoskeletal: unremarkable with normal function. No embolic signs or cyanosis.    Neuro:  Oriented; moves all 4; no focal deficits  Psychiatric: normal affect and mood, no memory impairment      Patient Vitals for the past 24 hrs:   BP Temp Pulse Resp SpO2   10/25/20 1119 107/60 98 °F (36.7 °C) (!) 101 19 100 %   10/25/20 0742 (!) 100/54 97.4 °F (36.3 °C) (!) 53 18 97 %   10/25/20 0413 106/60 98 °F (36.7 °C) 63 16 96 %   10/24/20 2337 (!) 96/52 97.8 °F (36.6 °C) 61 16 96 %   10/24/20 1939 104/61 98.2 °F (36.8 °C) 63 16 95 %   10/24/20 1531 (!) 105/55 98.2 °F (36.8 °C) (!) 55 16 98 %     Labs:    Recent Labs     10/24/20  0325   WBC 8.8   HGB 13.5*   *      K 4.4   *   CO2 31   BUN 14   CREA 1.08   *       Richard Dadds, NP

## 2020-10-25 NOTE — PROGRESS NOTES
END OF SHIFT NOTE:    INTAKE/OUTPUT  10/24 0701 - 10/25 0700  In: 1923 [I.V.:1923]  Out: 1300 [Urine:1300]  Voiding: YES  Catheter: NO  Drain:              Flatus: Patient does not have flatus present. Stool:  0 occurrences. Characteristics:  Stool Assessment  Stool Appearance: Other (comment)(before surgery; normal per pt)    Emesis: 0 occurrences. Characteristics:        VITAL SIGNS  Patient Vitals for the past 12 hrs:   Temp Pulse Resp BP SpO2   10/25/20 0413 98 °F (36.7 °C) 63 16 106/60 96 %   10/24/20 2337 97.8 °F (36.6 °C) 61 16 (!) 96/52 96 %   10/24/20 1939 98.2 °F (36.8 °C) 63 16 104/61 95 %       Pain Assessment  Pain Intensity 1: 2 (10/24/20 2319)  Pain Location 1: Head  Pain Intervention(s) 1: Refused  Patient Stated Pain Goal: 2    Ambulating  Yes    Shift report given to oncoming nurse at the bedside.     Barry Mcneal RN

## 2020-10-26 ENCOUNTER — APPOINTMENT (OUTPATIENT)
Dept: GENERAL RADIOLOGY | Age: 55
DRG: 327 | End: 2020-10-26
Attending: NURSE PRACTITIONER
Payer: COMMERCIAL

## 2020-10-26 VITALS
WEIGHT: 162.4 LBS | DIASTOLIC BLOOD PRESSURE: 62 MMHG | TEMPERATURE: 98.2 F | RESPIRATION RATE: 18 BRPM | SYSTOLIC BLOOD PRESSURE: 121 MMHG | HEIGHT: 70 IN | HEART RATE: 61 BPM | BODY MASS INDEX: 23.25 KG/M2 | OXYGEN SATURATION: 97 %

## 2020-10-26 PROCEDURE — 74011000636 HC RX REV CODE- 636: Performed by: SURGERY

## 2020-10-26 PROCEDURE — C9113 INJ PANTOPRAZOLE SODIUM, VIA: HCPCS | Performed by: PHYSICIAN ASSISTANT

## 2020-10-26 PROCEDURE — 74220 X-RAY XM ESOPHAGUS 1CNTRST: CPT

## 2020-10-26 PROCEDURE — 74011250636 HC RX REV CODE- 250/636: Performed by: PHYSICIAN ASSISTANT

## 2020-10-26 PROCEDURE — 74011000250 HC RX REV CODE- 250: Performed by: PHYSICIAN ASSISTANT

## 2020-10-26 RX ORDER — HYDROCODONE BITARTRATE AND ACETAMINOPHEN 5; 325 MG/1; MG/1
1 TABLET ORAL
Qty: 18 TAB | Refills: 0 | Status: SHIPPED | OUTPATIENT
Start: 2020-10-26 | End: 2020-10-26 | Stop reason: CLARIF

## 2020-10-26 RX ADMIN — HYDROMORPHONE HYDROCHLORIDE 1 MG: 1 INJECTION, SOLUTION INTRAMUSCULAR; INTRAVENOUS; SUBCUTANEOUS at 01:51

## 2020-10-26 RX ADMIN — DIATRIZOATE MEGLUMINE AND DIATRIZOATE SODIUM 60 ML: 660; 100 LIQUID ORAL; RECTAL at 09:01

## 2020-10-26 RX ADMIN — SODIUM CHLORIDE 40 MG: 9 INJECTION INTRAMUSCULAR; INTRAVENOUS; SUBCUTANEOUS at 09:17

## 2020-10-26 RX ADMIN — ONDANSETRON 4 MG: 2 INJECTION INTRAMUSCULAR; INTRAVENOUS at 01:49

## 2020-10-26 NOTE — PROGRESS NOTES
Patient DC instructions given to patient and spouse. No questions at this time. PIV removed, tip in tac. Patient discharged with patient belongs, transported via wheel chair, and accompanied by family. No distress noted.

## 2020-10-26 NOTE — PROGRESS NOTES
Care Management Interventions  PCP Verified by CM: Yes  Last Visit to PCP: 10/06/20  Mode of Transport at Discharge: Self  Transition of Care Consult (CM Consult): Discharge Planning  Current Support Network: Lives with Spouse  Confirm Follow Up Transport: Family  Discharge Location  Discharge Placement: Unable to determine at this time    Confirmed patient's demographics - patient is independent - lives with spouse. Plans to go home @ d/c. Will follow for any needs @ d/c.

## 2020-10-26 NOTE — PROGRESS NOTES
Problem: Falls - Risk of  Goal: *Absence of Falls  Description: Document Margarita Marrnatalie Fall Risk and appropriate interventions in the flowsheet.   Outcome: Resolved/Met     Problem: Patient Education: Go to Patient Education Activity  Goal: Patient/Family Education  Outcome: Resolved/Met

## 2020-10-26 NOTE — PROGRESS NOTES
haD/C from unit with belongings and RX. Accompanied by family and hospital personnel. No distress at time of D/C. Transported via w/c.

## 2020-10-26 NOTE — DISCHARGE SUMMARY
[facility] [address]  [place], [address] [facility]  [phone]   Discharge Summary     Tien Barillas  MRN: [id number]     : 1965     Age: 47 y. o. Admit date: 10/23/2020     Discharge date: 10/26/2020  Attending Physician: Augie Parada MD  Primary Discharge Diagnosis:   Principal Problem:    Achalasia (2015)    Active Problems:    Achalasia of esophagus (2015)      Primary Operations or Procedures Performed :  Procedure(s):  REDO HELLER MYOTOMY ROBOTIC ASSISTED     Brief History and Reason for Admission: Tien Barillas was admitted with the following history of present illness. Anuja Cutler is a 47 y.o. male who here to see me today to discuss redo Heller myotomy. He has achalasia and he had a robotic Heller myotomy in  and has done beautifully. Over the last 8 months this is returned. He is not losing weight but he is having a really hard time swallowing. He has had manometry as well as an upper GI which confirms the achalasia. He is now living in [place]. Hospital Course: The patient underwent Procedure(s):  REDO HELLER MYOTOMY ROBOTIC ASSISTED on 10/23/2020. His post operative gastrograffiin swallow study was negative on 10/26/2020. The patient progressed satisfactorily meeting milestones necessary for successful discharge including tolerating a diet, adequate mobility, adequate pain control, and active bowel function. Patient was deemed a good candidate for discharge at the time of morning rounding. They are to follow up as indicated in their provided discharge paperwork. The patient helped develop and voices understanding with the plan of care. They are amenable without reservations at this time to moving forward with discharge.      Condition at Discharge: Good    Discharge Medications:   Current Discharge Medication List      CONTINUE these medications which have NOT CHANGED    Details   aspirin 81 mg chewable tablet Take 81 mg by mouth daily. multivitamin (ONE A DAY) tablet Take 1 Tab by mouth daily. Cholecalciferol, Vitamin D3, 50,000 unit cap Take  by mouth every Sunday. Disposition: Home    Discharge Instructions/Follow-up Care:      MD Instructions: Follow-up with John Perez NP in 2 weeks  Keep incisions clean and dry, may remain uncovered. Do not apply lotions, creams or ointments to incisions. Diet - Clear liquids for 24 hours (broth, jello, tea, water, etc)--then may advance to full liquids for 48 hours (grits, creamy soups, ice cream, yogurt/pudding, etc)--then may advance to regular diet on Thursday if tolerating liquids  Activity - ambulate - as tolerated   May shower - no tub baths or soaking/submerging. No driving while taking narcotics. Do not drink alcohol while taking narcotics. Resume other home medications. If problems or questions arise, please call our office at [phone].      Greater than 30 minutes were spent discharging the patient    Signed:  Althea Baxter NP   10/26/2020  10:23 AM

## 2020-10-26 NOTE — PROGRESS NOTES
Care Management Interventions  PCP Verified by CM: Yes  Last Visit to PCP: 10/06/20  Mode of Transport at Discharge: Self  Transition of Care Consult (CM Consult): Discharge Planning  Current Support Network: Lives with Spouse  Confirm Follow Up Transport: Family  Discharge Location  Discharge Placement: Home with family assistance    Patient being d/c'd home today. No needs voiced at this time and patient is agreeable to d/c plan.

## 2020-10-26 NOTE — DISCHARGE INSTRUCTIONS
Discharge Instructions/Follow-up Plans:   MD Instructions: Follow-up with Radha Samayoa NP in 2 weeks  Keep incisions clean and dry, may remain uncovered. Do not apply lotions, creams or ointments to incisions. Diet - Clear liquids for 24 hours (broth, jello, tea, water, etc)--then may advance to full liquids for 48 hours (grits, creamy soups, ice cream, yogurt/pudding, etc)--then may advance to regular diet on Thursday if tolerating liquids  Activity - ambulate - as tolerated   May shower - no tub baths or soaking/submerging. No driving while taking narcotics. Do not drink alcohol while taking narcotics. Resume other home medications. If problems or questions arise, please call our office at [phone]. Greater than 30 minutes were spent discharging the patient        . Patient Education        Learning About Achalasia  What is achalasia? Achalasia (say \"fx-[id number]-[id number]-zhuh\") is a problem with the nerves that control the muscles in the esophagus, the tube that carries food and liquid to your stomach. The muscles may tighten or spasm. The muscle that connects the lower end of the esophagus to the stomach may not open and close the right way. Doctors aren't always sure what causes achalasia. It may be a problem with the nerves in your esophagus. Or it may be something you were born with. What happens when you have achalasia? When you have achalasia, you may have trouble swallowing foods and drinks. You may spit up (regurgitate) food. That means undigested food may come back up into your mouth. You may also have heartburn or pain in your chest.  These symptoms may cause you to lose weight. There's also a risk that you could inhale food or liquid into your lungs (aspiration). That may lead to pneumonia. How is achalasia treated? Medicines, such as nitroglycerin or calcium channel blockers, can help relax the muscles in your esophagus for a while.   You may need a procedure called esophageal dilation. During the procedure, you will get medicines through a needle in a vein (IV) in your arm or hand. These medicines reduce pain and will make you feel relaxed and drowsy. Your throat will also be numbed. You may not remember much about the treatment. The doctor will guide a balloon or a plastic tool (dilator) down your throat and into your esophagus. The dilator is used to widen any narrow area. To guide the dilator, the doctor may use a tool called an endoscope, or scope. It's a thin, flexible, lighted viewing tool. It goes into your mouth and down your throat. Or the doctor may use a thin wire as a guide. Some people have surgery for achalasia. You will have general anesthesia so that you are completely unaware and won't feel pain during the surgery. Surgeries include:  Heller myotomy. In this surgery, the doctor makes small cuts on the muscles inside the esophagus. This allows the opening to the stomach to relax so that food and drinks can pass through. It can be done through a thin tube that is placed in a small cut in the belly. Sometimes it's done through a larger cut (incision). You will stay in the hospital for 1 or 2 days after the surgery. Peroral endoscopic myotomy (POEM). This surgery also involves making small cuts on the muscles. But it's done through a scope that goes in through the mouth. You will stay in the hospital for a day after the surgery. Achalasia may also be treated with a botulinum toxin A (Botox) injection. Botox is injected into the muscle near the opening to the stomach through a scope. It helps the muscle relax. You may need another shot of Botox after 6 months or a year. Follow-up care is a key part of your treatment and safety. Be sure to make and go to all appointments, and call your doctor if you are having problems. It's also a good idea to know your test results and keep a list of the medicines you take. Where can you learn more?   Go to http://www.[employer].com/  Enter A002 in the search box to learn more about \"Learning About Achalasia. \"  Current as of: April 15, 2020               Content Version: 12.6  © 2505-8111 [employer]. Care instructions adapted under license by [employer] (which disclaims liability or warranty for this information). If you have questions about a medical condition or this instruction, always ask your healthcare professional. [place] [address] any warranty or liability for your use of this information. DISCHARGE SUMMARY from Nurse    PATIENT INSTRUCTIONS:    After general anesthesia or intravenous sedation, for 24 hours or while taking prescription Narcotics:  · Limit your activities  · Do not drive and operate hazardous machinery  · Do not make important personal or business decisions  · Do  not drink alcoholic beverages  · If you have not urinated within 8 hours after discharge, please contact your surgeon on call. Report the following to your surgeon:  · Excessive pain, swelling, redness or odor of or around the surgical area  · Temperature over 100.5  · Nausea and vomiting lasting longer than 4 hours or if unable to take medications  · Any signs of decreased circulation or nerve impairment to extremity: change in color, persistent  numbness, tingling, coldness or increase pain  · Any questions    What to do at Home:  Recommended activity: Activity as tolerated. If you experience any of the following symptoms fever greater than 100.5, nausea, vomiting, increased pain, please follow up with Jewel GRACIA. *  Please give a list of your current medications to your Primary Care Provider. *  Please update this list whenever your medications are discontinued, doses are      changed, or new medications (including over-the-counter products) are added.     *  Please carry medication information at all times in case of emergency situations. These are general instructions for a healthy lifestyle:    No smoking/ No tobacco products/ Avoid exposure to second hand smoke  Surgeon General's Warning:  Quitting smoking now greatly reduces serious risk to your health. Obesity, smoking, and sedentary lifestyle greatly increases your risk for illness    A healthy diet, regular physical exercise & weight monitoring are important for maintaining a healthy lifestyle    You may be retaining fluid if you have a history of heart failure or if you experience any of the following symptoms:  Weight gain of 3 pounds or more overnight or 5 pounds in a week, increased swelling in our hands or feet or shortness of breath while lying flat in bed. Please call your doctor as soon as you notice any of these symptoms; do not wait until your next office visit. The discharge information has been reviewed with the patient. The patient verbalized understanding. Discharge medications reviewed with the patient and appropriate educational materials and side effects teaching were provided.   ___________________________________________________________________________________________________________________________________

## 2021-01-21 ENCOUNTER — HOSPITAL ENCOUNTER (OUTPATIENT)
Age: 56
Setting detail: OUTPATIENT SURGERY
End: 2021-01-21
Attending: INTERNAL MEDICINE | Admitting: INTERNAL MEDICINE

## 2021-02-18 NOTE — PROGRESS NOTES
Patient has had his covid test that should result on 2/19/21. He states he had covid 1.5 months ago and that he will bring test results with him on Monday. Instructed patient to please contact us if his test came back positive.

## 2021-02-21 RX ORDER — SODIUM CHLORIDE, SODIUM LACTATE, POTASSIUM CHLORIDE, CALCIUM CHLORIDE 600; 310; 30; 20 MG/100ML; MG/100ML; MG/100ML; MG/100ML
100 INJECTION, SOLUTION INTRAVENOUS CONTINUOUS
Status: CANCELLED | OUTPATIENT
Start: 2021-02-21

## 2021-06-07 ENCOUNTER — HOSPITAL ENCOUNTER (OUTPATIENT)
Age: 56
Setting detail: OUTPATIENT SURGERY
Discharge: HOME | End: 2021-06-07
Attending: INTERNAL MEDICINE | Admitting: INTERNAL MEDICINE
Payer: COMMERCIAL

## 2021-06-07 ENCOUNTER — ANESTHESIA EVENT (OUTPATIENT)
Dept: ENDOSCOPY | Age: 56
End: 2021-06-07
Payer: COMMERCIAL

## 2021-06-07 ENCOUNTER — APPOINTMENT (OUTPATIENT)
Dept: GENERAL RADIOLOGY | Age: 56
End: 2021-06-07
Attending: INTERNAL MEDICINE
Payer: COMMERCIAL

## 2021-06-07 ENCOUNTER — ANESTHESIA (OUTPATIENT)
Dept: ENDOSCOPY | Age: 56
End: 2021-06-07
Payer: COMMERCIAL

## 2021-06-07 VITALS
HEART RATE: 45 BPM | TEMPERATURE: 97 F | OXYGEN SATURATION: 100 % | RESPIRATION RATE: 16 BRPM | WEIGHT: 161 LBS | DIASTOLIC BLOOD PRESSURE: 67 MMHG | SYSTOLIC BLOOD PRESSURE: 143 MMHG | HEIGHT: 69 IN | BODY MASS INDEX: 23.85 KG/M2

## 2021-06-07 PROCEDURE — 74011000250 HC RX REV CODE- 250: Performed by: NURSE ANESTHETIST, CERTIFIED REGISTERED

## 2021-06-07 PROCEDURE — C1726 CATH, BAL DIL, NON-VASCULAR: HCPCS | Performed by: INTERNAL MEDICINE

## 2021-06-07 PROCEDURE — 2709999900 HC NON-CHARGEABLE SUPPLY: Performed by: INTERNAL MEDICINE

## 2021-06-07 PROCEDURE — 74360 X-RAY GUIDE GI DILATION: CPT

## 2021-06-07 PROCEDURE — 76040000025: Performed by: INTERNAL MEDICINE

## 2021-06-07 PROCEDURE — 76060000032 HC ANESTHESIA 0.5 TO 1 HR: Performed by: INTERNAL MEDICINE

## 2021-06-07 PROCEDURE — 74011000636 HC RX REV CODE- 636: Performed by: INTERNAL MEDICINE

## 2021-06-07 PROCEDURE — 74011250636 HC RX REV CODE- 250/636: Performed by: NURSE ANESTHETIST, CERTIFIED REGISTERED

## 2021-06-07 PROCEDURE — 74011250636 HC RX REV CODE- 250/636: Performed by: ANESTHESIOLOGY

## 2021-06-07 RX ORDER — SODIUM CHLORIDE, SODIUM LACTATE, POTASSIUM CHLORIDE, CALCIUM CHLORIDE 600; 310; 30; 20 MG/100ML; MG/100ML; MG/100ML; MG/100ML
INJECTION, SOLUTION INTRAVENOUS
Status: DISCONTINUED | OUTPATIENT
Start: 2021-06-07 | End: 2021-06-07 | Stop reason: HOSPADM

## 2021-06-07 RX ORDER — SODIUM CHLORIDE, SODIUM LACTATE, POTASSIUM CHLORIDE, CALCIUM CHLORIDE 600; 310; 30; 20 MG/100ML; MG/100ML; MG/100ML; MG/100ML
1000 INJECTION, SOLUTION INTRAVENOUS CONTINUOUS
Status: DISCONTINUED | OUTPATIENT
Start: 2021-06-07 | End: 2021-06-07 | Stop reason: HOSPADM

## 2021-06-07 RX ORDER — LIDOCAINE HYDROCHLORIDE 20 MG/ML
INJECTION, SOLUTION EPIDURAL; INFILTRATION; INTRACAUDAL; PERINEURAL AS NEEDED
Status: DISCONTINUED | OUTPATIENT
Start: 2021-06-07 | End: 2021-06-07 | Stop reason: HOSPADM

## 2021-06-07 RX ORDER — ONDANSETRON 2 MG/ML
INJECTION INTRAMUSCULAR; INTRAVENOUS AS NEEDED
Status: DISCONTINUED | OUTPATIENT
Start: 2021-06-07 | End: 2021-06-07 | Stop reason: HOSPADM

## 2021-06-07 RX ORDER — PROPOFOL 10 MG/ML
INJECTION, EMULSION INTRAVENOUS AS NEEDED
Status: DISCONTINUED | OUTPATIENT
Start: 2021-06-07 | End: 2021-06-07 | Stop reason: HOSPADM

## 2021-06-07 RX ADMIN — PROPOFOL 40 MG: 10 INJECTION, EMULSION INTRAVENOUS at 13:37

## 2021-06-07 RX ADMIN — PROPOFOL 20 MG: 10 INJECTION, EMULSION INTRAVENOUS at 13:31

## 2021-06-07 RX ADMIN — PROPOFOL 40 MG: 10 INJECTION, EMULSION INTRAVENOUS at 13:29

## 2021-06-07 RX ADMIN — SODIUM CHLORIDE, SODIUM LACTATE, POTASSIUM CHLORIDE, AND CALCIUM CHLORIDE: 600; 310; 30; 20 INJECTION, SOLUTION INTRAVENOUS at 13:22

## 2021-06-07 RX ADMIN — PROPOFOL 50 MG: 10 INJECTION, EMULSION INTRAVENOUS at 13:26

## 2021-06-07 RX ADMIN — PROPOFOL 40 MG: 10 INJECTION, EMULSION INTRAVENOUS at 13:30

## 2021-06-07 RX ADMIN — PROPOFOL 50 MG: 10 INJECTION, EMULSION INTRAVENOUS at 13:34

## 2021-06-07 RX ADMIN — LIDOCAINE HYDROCHLORIDE 100 MG: 20 INJECTION, SOLUTION EPIDURAL; INFILTRATION; INTRACAUDAL; PERINEURAL at 13:26

## 2021-06-07 RX ADMIN — SODIUM CHLORIDE, SODIUM LACTATE, POTASSIUM CHLORIDE, AND CALCIUM CHLORIDE 1000 ML: 600; 310; 30; 20 INJECTION, SOLUTION INTRAVENOUS at 12:40

## 2021-06-07 RX ADMIN — DIATRIZOATE MEGLUMINE AND DIATRIZOATE SODIUM 10 ML: 660; 100 LIQUID ORAL; RECTAL at 13:54

## 2021-06-07 RX ADMIN — PROPOFOL 50 MG: 10 INJECTION, EMULSION INTRAVENOUS at 13:33

## 2021-06-07 RX ADMIN — PROPOFOL 50 MG: 10 INJECTION, EMULSION INTRAVENOUS at 13:28

## 2021-06-07 RX ADMIN — PROPOFOL 10 MG: 10 INJECTION, EMULSION INTRAVENOUS at 13:43

## 2021-06-07 RX ADMIN — PROPOFOL 20 MG: 10 INJECTION, EMULSION INTRAVENOUS at 13:41

## 2021-06-07 RX ADMIN — ONDANSETRON 4 MG: 2 INJECTION INTRAMUSCULAR; INTRAVENOUS at 13:35

## 2021-06-07 RX ADMIN — PROPOFOL 10 MG: 10 INJECTION, EMULSION INTRAVENOUS at 13:44

## 2021-06-07 RX ADMIN — PROPOFOL 50 MG: 10 INJECTION, EMULSION INTRAVENOUS at 13:39

## 2021-06-07 NOTE — ROUTINE PROCESS
Discharge instructions provided and reviewed with patient and wife. Verbalized understanding and no questions verbalized. Patient wheeled to car by staff via wheel chair. Patients wife driving patient to home.

## 2021-06-07 NOTE — ANESTHESIA PREPROCEDURE EVALUATION
Relevant Problems   No relevant active problems       Anesthetic History   No history of anesthetic complications            Review of Systems / Medical History  Patient summary reviewed and pertinent labs reviewed    Pulmonary  Within defined limits                 Neuro/Psych   Within defined limits           Cardiovascular                  Exercise tolerance: >4 METS     GI/Hepatic/Renal               Comments: Esophageal achalasia s/p Heller procedure  Ulcerative colitis Endo/Other  Within defined limits           Other Findings   Comments: Hx of Tongue ca         Physical Exam    Airway  Mallampati: II  TM Distance: > 6 cm  Neck ROM: normal range of motion   Mouth opening: Normal     Cardiovascular    Rhythm: regular           Dental  No notable dental hx       Pulmonary                 Abdominal  GI exam deferred       Other Findings            Anesthetic Plan    ASA: 2  Anesthesia type: total IV anesthesia and general - backup          Induction: Intravenous  Anesthetic plan and risks discussed with: Patient

## 2021-06-07 NOTE — H&P
Gastroenterology Outpatient History and Physical    Patient: [facility]    Physician: Lan Daniels MD    Vital Signs: Blood pressure (!) 142/83, pulse (!) 57, temperature 97.7 °F (36.5 °C), resp. rate 17, height 5' 9\" (1.753 m), weight 73 kg (161 lb), SpO2 100 %. Allergies: Allergies   Allergen Reactions    Sulfa (Sulfonamide Antibiotics) Rash and Other (comments)     Sores in mouth       Chief Complaint: dysphagia    History of Present Illness: Achalasia    Justification for Procedure: dilation    PMH:   Past Medical History:   Diagnosis Date    Achalasia of esophagus     Cancer of tonsil ([address] 05/08/2020    Treated with surgery and radiation treatment  in [place] [address] COVID-19 vaccine series completed     pt has received Moderma Vaccine X2 doses- pt to bring in vaccination card.  History of COVID-19     dx 1/2021- pt was not hospitalized    History of ulcerative colitis      last episode age 45    Sprain of ankle     bilat- resolved     PSH:   Past Surgical History:   Procedure Laterality Date    HX COLONOSCOPY  12/2015    normal- [facility]    HX HEENT  1982    nasal fx    HX ORTHOPAEDIC Left 1984    shoulder    HX OTHER SURGICAL  05/08/2020    Resection of neck, tonsil uvula and right submaxillary at [facility] in [address] UNLISTED  12/2015    heller myotomy     Prior to Admission Medications   Prescriptions Last Dose Informant Patient Reported? Taking? SELENIUM PO 6/4/2021  Yes No   Sig: Take  by mouth daily. aspirin 81 mg chewable tablet 6/4/2021  Yes No   Sig: Take 81 mg by mouth daily. Prevention   cholecalciferol, vitamin D3, (Vitamin D3) 50 mcg (2,000 unit) tab 6/4/2021  Yes No   Sig: Take  by mouth daily. docosahexaenoic acid/epa (FISH OIL PO) 6/4/2021  Yes No   Sig: Take  by mouth daily. multivitamin (ONE A DAY) tablet 6/4/2021  Yes No   Sig: Take 1 Tab by mouth daily.       Facility-Administered Medications: None       Physical Exam:   NEURO- A&O  LUNGS - CLEAR  HEART- RRR W/O M,G,R  Jessika Ragland M.D.               Patient Active Problem List   Diagnosis Code    Achalasia K22.0    Achalasia of esophagus K22.0

## 2021-06-07 NOTE — PROGRESS NOTES
Spiritual Care visit. Initial Visit, Pre Surgery Consult. Visit and prayer before patient goes to surgery.     Visit by Soy Perdomo M.Ed., Th.B. ,Staff

## 2021-06-07 NOTE — DISCHARGE INSTRUCTIONS
Gastrointestinal Esophagogastroduodenoscopy (EGD) - Upper Exam Discharge Instructions    1. Call Dr. Trini Khan at [phone] for any problems or questions. 2. Contact the doctor's office for follow up appointment as directed. 3. Medication may cause drowsiness for several hours, therefore:  · Do not drive or operate machinery for remainder of the day. · No alcohol today. · Do not make any important or legal decisions for 24 hours. · Do not sign any legal documents for 24 hours. 5. Ordinarily, you may resume regular diet and activity after exam unless otherwise specified by your physician. 6. For mild soreness in your throat you may use Cepacol throat lozenges or warm salt-water gargles as needed. Any additional instructions:  Contact doctor office by phone as needed.

## 2021-06-07 NOTE — PROCEDURES
ESOPHAGOGASTRODUODENOSCOPY    DATE of PROCEDURE: 6/7/2021    PT NAME: Derek Dakin     [id number]    MEDICATION:   MAC       INSTRUMENT: GIFH 190    SPECIAL PROCEDURE: 30 mm achalasia dilation  BLOOD LOSS- 0 or min. SPEC- no  IMPLANT- none    PROCEDURE:  After informed consent, the patient was placed Under anesthesia in the left lateral decubitus position. The endoscope was passed visually without difficulty. The examination was performed to the duodenum with the findings and treatments as outlined below. Patient's tolerated the procedure well. No apparent complications. ASSESSMENT:  1. 30 mm achalasia dilation performed for 2 min. Minimal waist seen. Minimal bleeding. No extravasation.     PLAN:   1. F/U by phone    Jayson Rutldege MD

## 2021-06-07 NOTE — ANESTHESIA POSTPROCEDURE EVALUATION
Procedure(s):  ESOPHAGOGASTRODUODENOSCOPY (EGD) WITH PNEUMATIC ACHLASIA DILATION- 30-40CM DIALATOR NEEDED/ 24.    total IV anesthesia, general - backup    Anesthesia Post Evaluation      Multimodal analgesia: multimodal analgesia not used between 6 hours prior to anesthesia start to PACU discharge  Patient location during evaluation: PACU  Patient participation: complete - patient participated  Level of consciousness: awake  Pain management: adequate  Airway patency: patent  Anesthetic complications: no  Cardiovascular status: acceptable  Respiratory status: acceptable  Hydration status: acceptable  Post anesthesia nausea and vomiting:  none  Final Post Anesthesia Temperature Assessment:  Normothermia (36.0-37.5 degrees C)      INITIAL Post-op Vital signs:   Vitals Value Taken Time   /79 06/07/21 1413   Temp     Pulse 50 06/07/21 1421   Resp 16 06/07/21 1413   SpO2 99 % 06/07/21 1421   Vitals shown include unvalidated device data.

## (undated) DEVICE — CARDINAL HEALTH FLEXIBLE LIGHT HANDLE COVER: Brand: CARDINAL HEALTH

## (undated) DEVICE — INTENDED FOR TISSUE SEPARATION, AND OTHER PROCEDURES THAT REQUIRE A SHARP SURGICAL BLADE TO PUNCTURE OR CUT.: Brand: BARD-PARKER SAFETY BLADES SIZE 11, STERILE

## (undated) DEVICE — SUT ETHBND 0 30IN SH GRN --

## (undated) DEVICE — GOWN,REINF,POLY,ECL,PP SLV,XL: Brand: MEDLINE

## (undated) DEVICE — Device

## (undated) DEVICE — KENDALL RADIOLUCENT FOAM MONITORING ELECTRODE RECTANGULAR SHAPE: Brand: KENDALL

## (undated) DEVICE — BLOCK BITE AD 60FR W/ VELC STRP ADDRESSES MOST PT AND

## (undated) DEVICE — VISUALIZATION SYSTEM: Brand: CLEARIFY

## (undated) DEVICE — CADIERE FORCEPS: Brand: ENDOWRIST

## (undated) DEVICE — RETRACTOR ENDOSCP SHFT L31MM DIA12MM BLK ATRAUM RECTANG

## (undated) DEVICE — 2, DISPOSABLE SUCTION/IRRIGATOR WITHOUT DISPOSABLE TIP: Brand: STRYKEFLOW

## (undated) DEVICE — COVER,TABLE,44X76,STERILE: Brand: MEDLINE

## (undated) DEVICE — BASIC SINGLE BASIN-LF: Brand: MEDLINE INDUSTRIES, INC.

## (undated) DEVICE — GENERAL LAPAROSCOPY: Brand: MEDLINE INDUSTRIES, INC.

## (undated) DEVICE — NEEDLE HYPO 21GA L1.5IN INTRAMUSCULAR S STL LATCH BVL UP

## (undated) DEVICE — FENESTRATED BIPOLAR FORCEPS: Brand: ENDOWRIST

## (undated) DEVICE — DISPOSABLE GRASPER CARTRIDGE: Brand: DIRECT DRIVE REPOSABLE GRASPERS

## (undated) DEVICE — APPLICATOR FBR TIP L6IN COT TIP WOOD SHFT SWAB 2000 PER CA

## (undated) DEVICE — BLADELESS OPTICAL TROCAR WITH FIXATION CANNULA: Brand: VERSAPORT

## (undated) DEVICE — ELECTRO LUBE IS A SINGLE PATIENT USE DEVICE THAT IS INTENDED TO BE USED ON ELECTROSURGICAL ELECTRODES TO REDUCE STICKING.: Brand: KEY SURGICAL ELECTRO LUBE

## (undated) DEVICE — ROBOTIC PROSTATE: Brand: MEDLINE INDUSTRIES, INC.

## (undated) DEVICE — DRAIN WND PENRS RADPQ 0.25X12 --

## (undated) DEVICE — DRAIN SURG PENROSE 0.25X12 IN CLOSED WND DRAINAGE PREM SIL

## (undated) DEVICE — CONNECTOR TBNG OD5-7MM O2 END DISP

## (undated) DEVICE — SYRINGE, LUER SLIP, STERILE, 60ML: Brand: MEDLINE

## (undated) DEVICE — MEGA SUTURECUT ND: Brand: ENDOWRIST

## (undated) DEVICE — DRAPE TWL SURG 16X26IN BLU ORB04] ALLCARE INC]

## (undated) DEVICE — NEEDLE HYPO 25GA L1.5IN BLU POLYPR HUB S STL REG BVL STR

## (undated) DEVICE — COLUMN DRAPE

## (undated) DEVICE — SPONGE LAP 18X18IN STRL -- 5/PK

## (undated) DEVICE — TRAY CATH 16F DRN BG LTX -- CONVERT TO ITEM 363158

## (undated) DEVICE — SEAL UNIV 5-8MM DISP BX/10 -- DA VINCI XI - SNGL USE

## (undated) DEVICE — SUT SLK 3-0 30IN SH BLK --

## (undated) DEVICE — SHEARS HARM INSRT CRV 8MM -- DA VINCI XI - SNGL USE

## (undated) DEVICE — ARM DRAPE

## (undated) DEVICE — MONOPOLAR CAUTERY CORD

## (undated) DEVICE — DERMABOND SKIN ADH 0.7ML -- DERMABOND ADVANCED 12/BX

## (undated) DEVICE — [HIGH FLOW INSUFFLATOR,  DO NOT USE IF PACKAGE IS DAMAGED,  KEEP DRY,  KEEP AWAY FROM SUNLIGHT,  PROTECT FROM HEAT AND RADIOACTIVE SOURCES.]: Brand: PNEUMOSURE

## (undated) DEVICE — CANNULA NSL ORAL AD FOR CAPNOFLEX CO2 O2 AIRLFE

## (undated) DEVICE — SUTURE SZ 0 27IN 5/8 CIR UR-6  TAPER PT VIOLET ABSRB VICRYL J603H

## (undated) DEVICE — BLADELESS OBTURATOR: Brand: WECK VISTA

## (undated) DEVICE — REM POLYHESIVE ADULT PATIENT RETURN ELECTRODE: Brand: VALLEYLAB

## (undated) DEVICE — BIPOLAR CAUTERY CORD

## (undated) DEVICE — SINGLE USE ACHALASIA BALLON DILATOR: Brand: RIGIFLEX II

## (undated) DEVICE — HARMONIC ACE

## (undated) DEVICE — PERMANENT CAUTERY HOOK: Brand: ENDOWRIST;DAVINCI SI

## (undated) DEVICE — INTENDED FOR TISSUE SEPARATION, AND OTHER PROCEDURES THAT REQUIRE A SHARP SURGICAL BLADE TO PUNCTURE OR CUT.: Brand: BARD-PARKER SAFETY BLADES SIZE 15, STERILE

## (undated) DEVICE — MARYLAND BIPOLAR FORCEPS: Brand: ENDOWRIST

## (undated) DEVICE — SUTURE MCRYL SZ 4-0 L27IN ABSRB UD L19MM PS-2 1/2 CIR PRIM Y426H